# Patient Record
Sex: FEMALE | Race: WHITE | NOT HISPANIC OR LATINO | Employment: FULL TIME | URBAN - NONMETROPOLITAN AREA
[De-identification: names, ages, dates, MRNs, and addresses within clinical notes are randomized per-mention and may not be internally consistent; named-entity substitution may affect disease eponyms.]

---
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
## 2024-08-09 ENCOUNTER — HOSPITAL ENCOUNTER (OUTPATIENT)
Dept: RESPIRATORY THERAPY | Facility: HOSPITAL | Age: 19
Discharge: HOME | End: 2024-08-09
Payer: COMMERCIAL

## 2024-08-09 ENCOUNTER — TRANSCRIBE ORDERS (OUTPATIENT)
Dept: ADMINISTRATIVE | Facility: HOSPITAL | Age: 19
End: 2024-08-09
Payer: COMMERCIAL

## 2024-08-09 DIAGNOSIS — R00.2 PALPITATIONS: ICD-10-CM

## 2024-08-09 DIAGNOSIS — R00.2 PALPITATIONS: Primary | ICD-10-CM

## 2024-08-09 PROCEDURE — 93246 EXT ECG>7D<15D RECORDING: CPT

## 2024-08-23 ENCOUNTER — TRANSCRIBE ORDERS (OUTPATIENT)
Dept: ADMINISTRATIVE | Facility: HOSPITAL | Age: 19
End: 2024-08-23
Payer: COMMERCIAL

## 2024-08-23 DIAGNOSIS — R00.2 PALPITATIONS: Primary | ICD-10-CM

## 2024-09-06 ENCOUNTER — OFFICE VISIT (OUTPATIENT)
Dept: CARDIOLOGY | Facility: CLINIC | Age: 19
End: 2024-09-06
Payer: COMMERCIAL

## 2024-09-06 VITALS
HEART RATE: 81 BPM | DIASTOLIC BLOOD PRESSURE: 66 MMHG | RESPIRATION RATE: 16 BRPM | WEIGHT: 121 LBS | OXYGEN SATURATION: 100 % | SYSTOLIC BLOOD PRESSURE: 103 MMHG | BODY MASS INDEX: 20.16 KG/M2 | HEIGHT: 65 IN

## 2024-09-06 DIAGNOSIS — R00.2 PALPITATIONS: Primary | ICD-10-CM

## 2024-09-06 DIAGNOSIS — I44.1 SECOND DEGREE AV BLOCK, MOBITZ TYPE I: ICD-10-CM

## 2024-09-06 PROCEDURE — 93000 ELECTROCARDIOGRAM COMPLETE: CPT | Performed by: INTERNAL MEDICINE

## 2024-09-06 PROCEDURE — 99204 OFFICE O/P NEW MOD 45 MIN: CPT | Performed by: INTERNAL MEDICINE

## 2024-09-06 NOTE — PROGRESS NOTES
Jose Bruner MD  Vesna Ramirez  2005  2024    Patient Active Problem List   Diagnosis    Pregnancy       Dear Jose Bruner MD:    Subjective     Vesna Ramirez is a 19 y.o. female with the problems as listed above, presents    Chief complaint: Recurrent palpitations and recent abnormal findings on Holter monitor.    History of Present Illness: Ms. Estella Ramirez is a young 19-year-old  female presenting with complaints of recurrent palpitations for which she underwent Holter monitor recently which revealed apparently some abnormal findings.  She is referred to us for further evaluation and management.  On further questioning she states that she has been having this palpitation on and off for the last couple of years.  They seem to occur at random both at work and while at home especially when she gets stressed out or when she is exercising.  She does have some intermittent lightheadedness but no syncope.  She had a couple of episodes of lightheadedness during the monitoring which correlated with sinus rhythm in the 90s.  Maximum heart rate was 180 bpm which seems to be sinus tachycardia on 8/15/2024 at 10:15 AM and she apparently was at work at that time.  She does not recall any unusual stressful situation at that time.  She denies any chest pains or shortness of breath.  He admits to drinking 2 cans of Pepsi a day but no energy drinks or coffee.  I have reviewed the Holter monitor and it revealed a few episodes of Mobitz type I AV block mostly in the midnight hours where patient is presumably asleep.  She had episodes of sinus tachycardia during wakeful hours.  Her symptoms of lightheadedness correlated with sinus rhythm in the 80s to 90s.      No Known Allergies:  No current outpatient medications on file.    Past Medical History:   Diagnosis Date    Heart murmur     Murmur     states resolved     Past Surgical History:   Procedure Laterality Date     SECTION   "    UMBILICAL HERNIA REPAIR       History reviewed. No pertinent family history.  Social History     Tobacco Use    Smoking status: Never    Smokeless tobacco: Never   Substance Use Topics    Alcohol use: Yes     Comment: occas    Drug use: Never     Review of Systems   Constitutional: Negative for chills, fever, malaise/fatigue, weight gain and weight loss.   HENT:  Negative for congestion and nosebleeds.    Cardiovascular:  Positive for palpitations. Negative for chest pain, irregular heartbeat, leg swelling and orthopnea.   Respiratory:  Negative for cough, hemoptysis and shortness of breath.    Gastrointestinal:  Positive for nausea and vomiting. Negative for abdominal pain, change in bowel habit, hematemesis and melena.   Genitourinary:  Negative for dysuria, frequency and hematuria.   Neurological:  Positive for dizziness and light-headedness. Negative for focal weakness, headaches and weakness.     Objective   Blood pressure 103/66, pulse 81, resp. rate 16, height 165.1 cm (65\"), weight 54.9 kg (121 lb), SpO2 100%, unknown if currently breastfeeding.  Body mass index is 20.14 kg/m².    Vitals reviewed.   Constitutional:       Appearance: Well-developed.   Eyes:      Conjunctiva/sclera: Conjunctivae normal.   HENT:      Head: Normocephalic.   Neck:      Thyroid: No thyromegaly.      Vascular: No JVD.      Trachea: No tracheal deviation.   Pulmonary:      Effort: No respiratory distress.      Breath sounds: Normal breath sounds. No wheezing. No rales.   Cardiovascular:      PMI at left midclavicular line. Normal rate. Regular rhythm. Normal S1. Normal S2.       Murmurs: There is no murmur.      No gallop.  No click. No rub.   Pulses:     Intact distal pulses.   Edema:     Peripheral edema absent.   Abdominal:      General: Bowel sounds are normal.      Palpations: Abdomen is soft. There is no abdominal mass.      Tenderness: There is no abdominal tenderness.   Musculoskeletal:      Cervical back: Normal range " "of motion and neck supple. Skin:     General: Skin is warm and dry.   Neurological:      Mental Status: Alert and oriented to person, place, and time.      Cranial Nerves: No cranial nerve deficit.       Lab Results   Component Value Date     06/13/2022    K 3.5 06/13/2022     06/13/2022    CO2 21.8 (L) 06/13/2022    BUN 8 06/13/2022    CREATININE 0.66 06/13/2022    GLUCOSE 164 (H) 06/13/2022    CALCIUM 9.4 06/13/2022    AST 14 06/13/2022    ALT 9 06/13/2022    ALKPHOS 27 (L) 06/13/2022     No results found for: \"CKTOTAL\"  Lab Results   Component Value Date    WBC 12.29 (H) 11/10/2022    HGB 10.3 (L) 11/10/2022    HCT 31.0 (L) 11/10/2022     11/10/2022     No results found for: \"INR\"  Lab Results   Component Value Date    MG 2.2 03/21/2022     Lab Results   Component Value Date    TSH 1.410 03/21/2022          ECG 12 Lead    Date/Time: 9/6/2024 10:25 AM  Performed by: Miguel Rubalcava MD    Authorized by: Miguel Rubalcava MD  Comparison: compared with previous ECG from 3/21/2022  Similar to previous ECG  Rhythm: sinus rhythm  Conduction: conduction normal  ST Segments: ST segments normal  T Waves: T waves normal            Assessment & Plan    Diagnosis Plan   1. Palpitations        2. Second degree AV block, Mobitz type I            Recommendations  Orders Placed This Encounter   Procedures    ECG 12 Lead      I have asked her to cut back on the consumption of the caffeinated beverages as much as possible and hopefully this will help with her symptoms of palpitations.  Since she has some transient second-degree Mobitz type I AV block, I am not going to put her on a beta-blocker at this time.  I do not see any indication for permanent pacemaker implantation at this time    Return in about 2 months (around 11/6/2024).    As always, Jose Bruner MD  I appreciate very much the opportunity to participate in the cardiovascular care of your patients. Please do not hesitate to call me with any " questions with regards to Vesna Rmairez's evaluation and management.     With Best Regards,        Miguel Rubalcava MD, [facility]    Please note that portions of this note were completed with a voice recognition program.

## 2024-09-06 NOTE — LETTER
October 21, 2024     Jose Bruner MD  [address] [facility] [address]    Patient: Vesna Ramirez   YOB: 2005   Date of Visit: 9/6/2024     Dear Jose Bruner MD:       Thank you for referring Vesna Ramirez to me for evaluation. Below are the relevant portions of my assessment and plan of care.    If you have questions, please do not hesitate to call me. I look forward to following Vesna along with you.         Sincerely,        Miguel Rubalcava MD        CC: No Recipients    Miguel Rubalcava MD  10/21/24 [id number]  Sign when Signing Visit  Jose Bruner MD  Vesna Ramirez  2005  09/06/2024    Patient Active Problem List   Diagnosis   • Pregnancy       Dear Jose Bruner MD:    Subjective     Vesna Ramirez is a 19 y.o. female with the problems as listed above, presents    Chief complaint: Recurrent palpitations and recent abnormal findings on Holter monitor.    History of Present Illness: Ms. Estella Ramirez is a young 19-year-old  female presenting with complaints of recurrent palpitations for which she underwent Holter monitor recently which revealed apparently some abnormal findings.  She is referred to us for further evaluation and management.  On further questioning she states that she has been having this palpitation on and off for the last couple of years.  They seem to occur at random both at work and while at home especially when she gets stressed out or when she is exercising.  She does have some intermittent lightheadedness but no syncope.  She had a couple of episodes of lightheadedness during the monitoring which correlated with sinus rhythm in the 90s.  Maximum heart rate was 180 bpm which seems to be sinus tachycardia on 8/15/2024 at 10:15 AM and she apparently was at work at that time.  She does not recall any unusual stressful situation at that time.  She denies any chest pains or shortness of breath.  He admits to  "drinking 2 cans of Pepsi a day but no energy drinks or coffee.  I have reviewed the Holter monitor and it revealed a few episodes of Mobitz type I AV block mostly in the midnight hours where patient is presumably asleep.  She had episodes of sinus tachycardia during wakeful hours.  Her symptoms of lightheadedness correlated with sinus rhythm in the 80s to 90s.      No Known Allergies:  No current outpatient medications on file.    Past Medical History:   Diagnosis Date   • Heart murmur    • Murmur     states resolved     Past Surgical History:   Procedure Laterality Date   •  SECTION     • UMBILICAL HERNIA REPAIR       History reviewed. No pertinent family history.  Social History     Tobacco Use   • Smoking status: Never   • Smokeless tobacco: Never   Substance Use Topics   • Alcohol use: Yes     Comment: occas   • Drug use: Never     Review of Systems   Constitutional: Negative for chills, fever, malaise/fatigue, weight gain and weight loss.   HENT:  Negative for congestion and nosebleeds.    Cardiovascular:  Positive for palpitations. Negative for chest pain, irregular heartbeat, leg swelling and orthopnea.   Respiratory:  Negative for cough, hemoptysis and shortness of breath.    Gastrointestinal:  Positive for nausea and vomiting. Negative for abdominal pain, change in bowel habit, hematemesis and melena.   Genitourinary:  Negative for dysuria, frequency and hematuria.   Neurological:  Positive for dizziness and light-headedness. Negative for focal weakness, headaches and weakness.     Objective   Blood pressure 103/66, pulse 81, resp. rate 16, height 165.1 cm (65\"), weight 54.9 kg (121 lb), SpO2 100%, unknown if currently breastfeeding.  Body mass index is 20.14 kg/m².    Vitals reviewed.   Constitutional:       Appearance: Well-developed.   Eyes:      Conjunctiva/sclera: Conjunctivae normal.   HENT:      Head: Normocephalic.   Neck:      Thyroid: No thyromegaly.      Vascular: No JVD.      Trachea: No " "tracheal deviation.   Pulmonary:      Effort: No respiratory distress.      Breath sounds: Normal breath sounds. No wheezing. No rales.   Cardiovascular:      PMI at left midclavicular line. Normal rate. Regular rhythm. Normal S1. Normal S2.       Murmurs: There is no murmur.      No gallop.  No click. No rub.   Pulses:     Intact distal pulses.   Edema:     Peripheral edema absent.   Abdominal:      General: Bowel sounds are normal.      Palpations: Abdomen is soft. There is no abdominal mass.      Tenderness: There is no abdominal tenderness.   Musculoskeletal:      Cervical back: Normal range of motion and neck supple. Skin:     General: Skin is warm and dry.   Neurological:      Mental Status: Alert and oriented to person, place, and time.      Cranial Nerves: No cranial nerve deficit.       Lab Results   Component Value Date     06/13/2022    K 3.5 06/13/2022     06/13/2022    CO2 21.8 (L) 06/13/2022    BUN 8 06/13/2022    CREATININE 0.66 06/13/2022    GLUCOSE 164 (H) 06/13/2022    CALCIUM 9.4 06/13/2022    AST 14 06/13/2022    ALT 9 06/13/2022    ALKPHOS 27 (L) 06/13/2022     No results found for: \"CKTOTAL\"  Lab Results   Component Value Date    WBC 12.29 (H) 11/10/2022    HGB 10.3 (L) 11/10/2022    HCT 31.0 (L) 11/10/2022     11/10/2022     No results found for: \"INR\"  Lab Results   Component Value Date    MG 2.2 03/21/2022     Lab Results   Component Value Date    TSH 1.410 03/21/2022          ECG 12 Lead    Date/Time: 9/6/2024 10:25 AM  Performed by: Miguel Rubalcava MD    Authorized by: Miguel Rubalcava MD  Comparison: compared with previous ECG from 3/21/2022  Similar to previous ECG  Rhythm: sinus rhythm  Conduction: conduction normal  ST Segments: ST segments normal  T Waves: T waves normal            Assessment & Plan    Diagnosis Plan   1. Palpitations        2. Second degree AV block, Mobitz type I            Recommendations  Orders Placed This Encounter   Procedures   • ECG 12 " Lead      I have asked her to cut back on the consumption of the caffeinated beverages as much as possible and hopefully this will help with her symptoms of palpitations.  Since she has some transient second-degree Mobitz type I AV block, I am not going to put her on a beta-blocker at this time.  I do not see any indication for permanent pacemaker implantation at this time    Return in about 2 months (around 11/6/2024).    As always, Jose Bruner MD  I appreciate very much the opportunity to participate in the cardiovascular care of your patients. Please do not hesitate to call me with any questions with regards to Vesna Ramirez's evaluation and management.     With Best Regards,        Miguel Rubalcava MD, [facility]    Please note that portions of this note were completed with a voice recognition program.